# Patient Record
Sex: MALE | Race: WHITE
[De-identification: names, ages, dates, MRNs, and addresses within clinical notes are randomized per-mention and may not be internally consistent; named-entity substitution may affect disease eponyms.]

---

## 2019-08-02 ENCOUNTER — HOSPITAL ENCOUNTER (EMERGENCY)
Dept: HOSPITAL 74 - JER | Age: 64
Discharge: HOME | End: 2019-08-02
Payer: COMMERCIAL

## 2019-08-02 VITALS — TEMPERATURE: 97.7 F

## 2019-08-02 VITALS — BODY MASS INDEX: 37 KG/M2

## 2019-08-02 DIAGNOSIS — F43.10: ICD-10-CM

## 2019-08-02 DIAGNOSIS — F17.210: ICD-10-CM

## 2019-08-02 DIAGNOSIS — Z79.84: ICD-10-CM

## 2019-08-02 DIAGNOSIS — M79.651: ICD-10-CM

## 2019-08-02 DIAGNOSIS — R06.02: Primary | ICD-10-CM

## 2019-08-02 DIAGNOSIS — E11.9: ICD-10-CM

## 2019-08-02 DIAGNOSIS — F41.9: ICD-10-CM

## 2019-08-02 LAB
ALBUMIN SERPL-MCNC: 4.3 G/DL (ref 3.4–5)
ALP SERPL-CCNC: 89 U/L (ref 45–117)
ALT SERPL-CCNC: 29 U/L (ref 13–61)
ANION GAP SERPL CALC-SCNC: 7 MMOL/L (ref 8–16)
ARTERIAL BLOOD GAS PCO2: 40.1 MMHG (ref 35–45)
AST SERPL-CCNC: 18 U/L (ref 15–37)
BASE EXCESS BLDA CALC-SCNC: 1.5 MEQ/L (ref -2–2)
BASOPHILS # BLD: 0.5 % (ref 0–2)
BILIRUB SERPL-MCNC: 0.3 MG/DL (ref 0.2–1)
BNP SERPL-MCNC: 60 PG/ML (ref 5–125)
BUN SERPL-MCNC: 15.3 MG/DL (ref 7–18)
CALCIUM SERPL-MCNC: 9.9 MG/DL (ref 8.5–10.1)
CHLORIDE SERPL-SCNC: 103 MMOL/L (ref 98–107)
CO2 SERPL-SCNC: 29 MMOL/L (ref 21–32)
COHGB MFR BLD: 1.4 % (ref 0–2)
CREAT SERPL-MCNC: 1 MG/DL (ref 0.55–1.3)
DEPRECATED RDW RBC AUTO: 13.7 % (ref 11.9–15.9)
EOSINOPHIL # BLD: 0.8 % (ref 0–4.5)
GLUCOSE SERPL-MCNC: 136 MG/DL (ref 74–106)
HCT VFR BLD CALC: 42 % (ref 35.4–49)
HGB BLD-MCNC: 14.1 GM/DL (ref 11.7–16.9)
INR BLD: 1.03 (ref 0.83–1.09)
LYMPHOCYTES # BLD: 28.1 % (ref 8–40)
MCH RBC QN AUTO: 29 PG (ref 25.7–33.7)
MCHC RBC AUTO-ENTMCNC: 33.7 G/DL (ref 32–35.9)
MCV RBC: 86 FL (ref 80–96)
MONOCYTES # BLD AUTO: 8.5 % (ref 3.8–10.2)
NEUTROPHILS # BLD: 62.1 % (ref 42.8–82.8)
PLATELET # BLD AUTO: 241 K/MM3 (ref 134–434)
PMV BLD: 9.3 FL (ref 7.5–11.1)
PO2 BLDA: 53.9 MMHG (ref 80–105)
POTASSIUM SERPLBLD-SCNC: 4.1 MMOL/L (ref 3.5–5.1)
PROT SERPL-MCNC: 7.6 G/DL (ref 6.4–8.2)
PT PNL PPP: 12.2 SEC (ref 9.7–13)
RBC # BLD AUTO: 4.89 M/MM3 (ref 4–5.6)
SAO2 % BLDA: 87.2 % (ref 95–98)
SODIUM SERPL-SCNC: 139 MMOL/L (ref 136–145)
WBC # BLD AUTO: 8.7 K/MM3 (ref 4–10)

## 2019-08-02 PROCEDURE — 3E033NZ INTRODUCTION OF ANALGESICS, HYPNOTICS, SEDATIVES INTO PERIPHERAL VEIN, PERCUTANEOUS APPROACH: ICD-10-PCS | Performed by: EMERGENCY MEDICINE

## 2019-08-02 NOTE — PDOC
History of Present Illness





- General


Chief Complaint: Shortness of Breath


Stated Complaint: SOB


Time Seen by Provider: 08/02/19 16:16


History Source: Patient


Exam Limitations: No Limitations





- History of Present Illness


Initial Comments: 





08/02/19 16:26


65 yo M with a hx of DM, anxiety disorder, and PTSD presents to the emergency 

department with SOB for the past 1 week. Denies worsening throughout the week. 

Endorses frequent anxiety attacks over the past few months and only uses xanax 

on a daily basis. Denies chest pain. Denies pleuritic chest pain. Denies the 

following: hx of asthma, hx of COPD, fever, chills, nausea, vomiting, pain 

radiating to back, abdominal pain, lightheadedness, dizziness, diarrhea, dysuria

, hematuria, and leg swelling. Endorses anxiety and right hip pain with right 

anterior thigh pain that began 2 days ago. 





Allergies: NKDA


Social: Denies tobacco and alcohol. Uses marijuana weekly. 


Shx: multiple msk surgeries including right hip replacement. 














Past History





- Past Medical History


Allergies/Adverse Reactions: 


 Allergies











Allergy/AdvReac Type Severity Reaction Status Date / Time


 


No Known Allergies Allergy   Verified 08/03/19 11:40











Home Medications: 


Ambulatory Orders





Alprazolam [Xanax] 1 mg PO DAILY 11/26/14 


Ibuprofen [Motrin] 800 mg PO TID #20 tablet 11/26/14 


metFORMIN HCL [Glucophage -] 500 mg PO DAILY 11/26/14 


Diclofenac Sodium [Voltaren] 4 gm TP Q6H #1 gel..gram. 08/03/19 


Oxycodone HCl/Acetaminophen [Percocet 5-325 mg Tablet] 1 tab PO Q6H PRN #5 

tablet MDD 4 08/03/19 








COPD: No


Diabetes: Yes





- Immunization History


Immunization Up to Date: Yes





- Suicide/Smoking/Psychosocial Hx


Smoking History: Current every day smoker


Have you smoked in the past 12 months: Yes


Number of Cigarettes Smoked Daily: 2


Information on smoking cessation initiated: No


Hx Alcohol Use: No


Drug/Substance Use Hx: Yes


Substance Use Type: None





**Review of Systems





- Review of Systems


Able to Perform ROS?: Yes


Is the patient limited English proficient: No


Constitutional: No: Chills, Diaphoresis, Fever, Weakness


HEENTM: No: Eye Pain, Ear Pain, Nose Pain, Throat Pain, Mouth Pain


Respiratory: Yes: Cough, Shortness of Breath.  No: Hemoptysis


Cardiac (ROS): No: Chest Pain, Edema, Lightheadedness, Palpitations, Syncope, 

Chest Tightness


ABD/GI: No: Constipated, Diarrhea, Nausea, Vomiting


: No: Burning, Dysuria, Hematuria, Incontinence, Pain


Musculoskeletal: Yes: Joint Pain (right hip).  No: Back Pain, Neck Pain


Integumentary: No: Bruising, Erythema, Rash


Neurological: No: Headache, Numbness, Tingling, Tremors


Psychiatric: Yes: Anxiety, Depression.  No: Stressors


Endocrine: No: Unexplained Weight Gain


Hematologic/Lymphatic: No: Anemia





*Physical Exam





- Vital Signs


 Last Vital Signs











Temp Pulse Resp BP Pulse Ox


 


 97.9 F   89   30 H  157/100   99 


 


 08/02/19 15:39  08/02/19 15:39  08/02/19 15:39  08/02/19 15:39  08/02/19 15:39














- Physical Exam


General Appearance: Yes: Nourished, Appropriately Dressed, Other (tachypnea).  

No: Apparent Distress, Intoxicated


HEENT: positive: EOMI, NADYA, Normal Voice, Symmetrical, Pharynx Normal, Hearing 

Grossly Normal.  negative: Pale Conjunctivae, Scleral Icterus (R), Scleral 

Icterus (L), Muffled/Hoarse voice, Pharyngeal Erythema, Tonsillar Exudate, 

Tonsillar Erythema, Nasal Congestion, Rhinorrhea, Excessive drooling


Neck: positive: Trachea midline, Supple.  negative: Tender, Lymphadenopathy (R)

, Lymphadenopathy (L), Tender lateral, Tender midline


Respiratory/Chest: positive: Lungs Clear, Normal Breath Sounds, Rapid RR.  

negative: Chest Tender, Respiratory Distress, Accessory Muscle Use, Crackles, 

Rales, Rhonchi, Stridor, Wheezing, Plerual Rub


Cardiovascular: positive: Regular Rhythm, Regular Rate, S1, S2.  negative: 

Systolic Murmur


Gastrointestinal/Abdominal: positive: Normal Bowel Sounds, Flat, Soft.  negative

: Tender, Distended, Guarding, Rebound


Lymphatic: negative: Adenopathy


Musculoskeletal: positive: Normal Inspection.  negative: CVA Tenderness, 

Vertebral Tenderness


Extremity: positive: Normal Capillary Refill, Normal Inspection, Normal Range 

of Motion, Tender


Integumentary: positive: Normal Color, Dry, Warm


Neurologic: positive: CNs II-XII NML intact, Fully Oriented, Alert, Normal Mood/

Affect, Normal Response, Motor Strength 5/5.  negative: EOM Palsy, Facial Droop





ED Treatment Course





- LABORATORY


CBC & Chemistry Diagram: 


 08/02/19 16:45





 08/02/19 16:45





- RADIOLOGY


Radiology Studies Ordered: 














 Category Date Time Status


 


 CHEST X-RAY PORTABLE* [RAD] [facility]  08/02/19 16:11 Ordered














*DC/Admit/Observation/Transfer


Diagnosis at time of Disposition: 


 Shortness of breath








- Discharge Dispostion


Disposition: HOME


Decision to Admit order: No





- Referrals


Referrals: 


Isak Carias MD [Primary Care Provider] - 


Rehan Valdivia MD [Staff Physician] - 





- Patient Instructions


Printed Discharge Instructions:  Generalized Anxiety Disorder, Anxiety Disorders

, DI for Anxiety -- Adult


Additional Instructions: 


You were seen in the emergency department for your shortness of breath. Please 

follow up with your primary medical doctor for evaluation for psychiatry to 

help with the anxiety. Please return to the emergency department if you have 

worsening symptoms. Thank you. 





- Post Discharge Activity


Forms/Work/School Notes:  Back to Work

## 2019-08-02 NOTE — PDOC
Documentation entered by Claudia Ceja SCRIBE, acting as scribe for Rex Tariq MD.








Rex Tariq MD:  This documentation has been prepared by the Marcial knowles Xhesika, SCRIBE, under my direction and personally reviewed by me in its entirety.  I 

confirm that the documentation accurately reflects all work, treatment, 

procedures, and medical decision making performed by me.  





Attending Attestation





- Resident


Resident Name: Josiah Chamberlain





- ED Attending Attestation


I have performed the following: I have examined & evaluated the patient, The 

case was reviewed & discussed with the resident, I agree w/resident's findings 

& plan, Exceptions are as noted





- HPI


HPI: 





08/02/19 17:02


The patient is a 64 year old male with a significant PMH of DM, anxiety disorder

, and PTSD who presents to the emergency department with shortness of breath x 

3 days. Pt states that he has been feeling short of breath at rest, 

particularly when he lies down. He denies any chest pain. States that today, he 

felt like his breathing was getting worse. He states that he is unable to draw 

in a deep breath. Denies F/C. Denies cough. Endorses nasal congestion. Pt also 

endorses R thigh pain x several days. He states the pain radiates from his 

thigh down to his knee. Denies any leg swelling. 





The patient denies chest pain, headache and dizziness. Denies fever, chills, 

cough, nausea, vomiting, diarrhea and constipation. Denies dysuria, frequency, 

urgency and hematuria.





Allergies: NKDA


Social history: current everyday smoker.  


PCP: Dr. Carias








- Physicial Exam


PE: 





08/02/19 17:03


GENERAL: Awake, alert, and fully oriented, in no acute distress.


HEAD: No signs of trauma


EYES: PERRLA, EOMI, sclera anicteric, conjunctiva clear


ENT: Auricles normal inspection, hearing grossly normal, nares patent, 

oropharynx clear without exudates. Moist mucosa


NECK: Nontender, no stepoffs, Normal ROM, supple, no lymphadenopathy, JVD, or 

masses


LUNGS: Breath sounds equal, clear to auscultation bilaterally.  No wheezes, and 

no crackles


HEART: Regular rate and rhythm, normal S1 and S2, no murmurs, rubs or gallops


ABDOMEN: Soft, nontender, normoactive bowel sounds.  No guarding, no rebound.  

No masses


EXTREMITIES: Normal range of motion, no edema.  No clubbing or cyanosis. No 

cords, erythema, or tenderness


NEUROLOGICAL: Cranial nerves II through XII intact. 5/5 strength and sensation 

in all extremities, Normal speech, normal gait, normal cerebellar function


SKIN: Warm, Dry, normal turgor, no rashes or lesions noted.





- Medical Decision Making





08/02/19 17:29


64 M with SOB and R thigh pain. Pt with stable vitals, clear lungs. Will 

evaluate for PE given concurrent R thigh pain. Will also r/o ACS with serial 

trops. EKG is nonischemic. 


- Labs, trop, Ddimer


- CXR


- CTA if indicated





08/02/19 18:09


Ddimer elevated


Labs otherwise wnl





CTA ordered





Pt signed out to Dr. Edwards at 7PM, pending CT and re-evaluation.

## 2019-08-03 ENCOUNTER — HOSPITAL ENCOUNTER (EMERGENCY)
Dept: HOSPITAL 74 - JERFT | Age: 64
Discharge: HOME | End: 2019-08-03
Payer: COMMERCIAL

## 2019-08-03 VITALS — SYSTOLIC BLOOD PRESSURE: 166 MMHG | DIASTOLIC BLOOD PRESSURE: 85 MMHG | HEART RATE: 76 BPM

## 2019-08-04 NOTE — EKG
Test Reason : 

Blood Pressure : ***/*** mmHG

Vent. Rate : 070 BPM     Atrial Rate : 070 BPM

   P-R Int : 164 ms          QRS Dur : 094 ms

    QT Int : 398 ms       P-R-T Axes : 026 -04 016 degrees

   QTc Int : 429 ms

 

SINUS RHYTHM WITH PREMATURE SUPRAVENTRICULAR COMPLEXES

OTHERWISE NORMAL ECG

NO PREVIOUS ECGS AVAILABLE

Confirmed by Gena Cervantes ([id number]) on 8/4/2019 10:36:30 AM

 

Referred By:             Confirmed By:Gena Cervantes

## 2021-03-19 ENCOUNTER — HOSPITAL ENCOUNTER (EMERGENCY)
Dept: HOSPITAL 74 - JCOVINFU | Age: 66
Discharge: HOME | End: 2021-03-19
Payer: COMMERCIAL

## 2021-03-19 VITALS — SYSTOLIC BLOOD PRESSURE: 140 MMHG | DIASTOLIC BLOOD PRESSURE: 86 MMHG | TEMPERATURE: 98.7 F

## 2021-03-19 VITALS — HEART RATE: 89 BPM

## 2021-03-19 VITALS — BODY MASS INDEX: 29.2 KG/M2

## 2021-03-19 DIAGNOSIS — U07.1: Primary | ICD-10-CM

## 2021-03-19 LAB
ALBUMIN SERPL-MCNC: 4.5 G/DL (ref 3.4–5)
ALP SERPL-CCNC: 118 U/L (ref 45–117)
ALT SERPL-CCNC: 44 U/L (ref 13–61)
ANION GAP SERPL CALC-SCNC: 6 MMOL/L (ref 8–16)
AST SERPL-CCNC: 21 U/L (ref 15–37)
BASOPHILS # BLD: 0.2 % (ref 0–2)
BILIRUB SERPL-MCNC: 0.4 MG/DL (ref 0.2–1)
BUN SERPL-MCNC: 22.3 MG/DL (ref 7–18)
CALCIUM SERPL-MCNC: 10.6 MG/DL (ref 8.5–10.1)
CHLORIDE SERPL-SCNC: 106 MMOL/L (ref 98–107)
CO2 SERPL-SCNC: 27 MMOL/L (ref 21–32)
CREAT SERPL-MCNC: 1.1 MG/DL (ref 0.55–1.3)
DEPRECATED RDW RBC AUTO: 13.5 % (ref 11.9–15.9)
EOSINOPHIL # BLD: 0.7 % (ref 0–4.5)
GLUCOSE SERPL-MCNC: 123 MG/DL (ref 74–106)
HCT VFR BLD CALC: 47.9 % (ref 35.4–49)
HGB BLD-MCNC: 16.3 GM/DL (ref 11.7–16.9)
LYMPHOCYTES # BLD: 21.3 % (ref 8–40)
MCH RBC QN AUTO: 29.7 PG (ref 25.7–33.7)
MCHC RBC AUTO-ENTMCNC: 34 G/DL (ref 32–35.9)
MCV RBC: 87.2 FL (ref 80–96)
MONOCYTES # BLD AUTO: 10.3 % (ref 3.8–10.2)
NEUTROPHILS # BLD: 67.5 % (ref 42.8–82.8)
PLATELET # BLD AUTO: 270 K/MM3 (ref 134–434)
PMV BLD: 8.8 FL (ref 7.5–11.1)
POTASSIUM SERPLBLD-SCNC: 4.3 MMOL/L (ref 3.5–5.1)
PROT SERPL-MCNC: 8.5 G/DL (ref 6.4–8.2)
RBC # BLD AUTO: 5.5 M/MM3 (ref 4–5.6)
SODIUM SERPL-SCNC: 139 MMOL/L (ref 136–145)
WBC # BLD AUTO: 10 K/MM3 (ref 4–10)

## 2021-03-19 PROCEDURE — M0239 BAMLANIVIMAB-XXXX INFUSION: HCPCS
